# Patient Record
Sex: FEMALE | Race: WHITE | NOT HISPANIC OR LATINO | Employment: OTHER | URBAN - METROPOLITAN AREA
[De-identification: names, ages, dates, MRNs, and addresses within clinical notes are randomized per-mention and may not be internally consistent; named-entity substitution may affect disease eponyms.]

---

## 2022-08-22 ENCOUNTER — HOSPITAL ENCOUNTER (EMERGENCY)
Facility: HOSPITAL | Age: 77
Discharge: HOME | End: 2022-08-22
Attending: EMERGENCY MEDICINE | Admitting: EMERGENCY MEDICINE

## 2022-08-22 ENCOUNTER — APPOINTMENT (OUTPATIENT)
Dept: GENERAL RADIOLOGY | Facility: HOSPITAL | Age: 77
End: 2022-08-22

## 2022-08-22 VITALS
WEIGHT: 157 LBS | HEART RATE: 69 BPM | OXYGEN SATURATION: 93 % | BODY MASS INDEX: 24.64 KG/M2 | TEMPERATURE: 97.8 F | RESPIRATION RATE: 16 BRPM | DIASTOLIC BLOOD PRESSURE: 107 MMHG | SYSTOLIC BLOOD PRESSURE: 124 MMHG | HEIGHT: 67 IN

## 2022-08-22 DIAGNOSIS — M54.32 SCIATICA OF LEFT SIDE: Primary | ICD-10-CM

## 2022-08-22 PROCEDURE — 25010000002 KETOROLAC TROMETHAMINE PER 15 MG: Performed by: NURSE PRACTITIONER

## 2022-08-22 PROCEDURE — 72100 X-RAY EXAM L-S SPINE 2/3 VWS: CPT

## 2022-08-22 PROCEDURE — 96372 THER/PROPH/DIAG INJ SC/IM: CPT

## 2022-08-22 PROCEDURE — 99282 EMERGENCY DEPT VISIT SF MDM: CPT

## 2022-08-22 RX ORDER — KETOROLAC TROMETHAMINE 30 MG/ML
30 INJECTION, SOLUTION INTRAMUSCULAR; INTRAVENOUS ONCE
Status: COMPLETED | OUTPATIENT
Start: 2022-08-22 | End: 2022-08-22

## 2022-08-22 RX ORDER — IBUPROFEN 600 MG/1
600 TABLET ORAL EVERY 6 HOURS PRN
Qty: 24 TABLET | Refills: 0 | Status: SHIPPED | OUTPATIENT
Start: 2022-08-22

## 2022-08-22 RX ORDER — CYCLOBENZAPRINE HCL 10 MG
10 TABLET ORAL 3 TIMES DAILY PRN
Qty: 15 TABLET | Refills: 0 | Status: SHIPPED | OUTPATIENT
Start: 2022-08-22

## 2022-08-22 RX ADMIN — KETOROLAC TROMETHAMINE 30 MG: 30 INJECTION, SOLUTION INTRAMUSCULAR; INTRAVENOUS at 15:08

## 2022-08-22 NOTE — ED PROVIDER NOTES
Subjective   Latonya Garay is a 77-year-old female that presents emergency department for complaints of pain in her left posterior buttock.  Pain started 2-1/2 weeks ago.  Patient reports that the pain radiates down her left lower leg.  Patient denies any injury.  Patient denies any loss of bowel or bladder function.  Patient is able to ambulate.  Patient explains that she has a history of chronic left knee pain.  Patient explains that she does get her cortisone shot in her left knee regularly.      History provided by:  Patient   used: No    Leg Pain  Location:  Leg  Injury: no    Leg location:  L leg  Pain details:     Quality:  Aching    Severity:  Moderate    Timing:  Intermittent  Associated symptoms: no back pain, no decreased ROM, no muscle weakness, no numbness and no tingling        Review of Systems   Respiratory: Negative for shortness of breath.    Cardiovascular: Negative for chest pain.   Musculoskeletal: Positive for arthralgias and myalgias. Negative for back pain.   Neurological: Negative for weakness and numbness.   All other systems reviewed and are negative.      No past medical history on file.    No Known Allergies    No past surgical history on file.    No family history on file.    Social History     Socioeconomic History   • Marital status:            Objective   Physical Exam  Vitals and nursing note reviewed.   Constitutional:       Appearance: Normal appearance. She is well-developed. She is not toxic-appearing.   HENT:      Head: Normocephalic and atraumatic.   Eyes:      General: Lids are normal.      Conjunctiva/sclera: Conjunctivae normal.   Neck:      Trachea: Trachea normal.   Cardiovascular:      Rate and Rhythm: Regular rhythm.      Pulses: Normal pulses.      Heart sounds: Normal heart sounds.   Pulmonary:      Effort: Pulmonary effort is normal. No respiratory distress.      Breath sounds: Normal breath sounds. No decreased breath sounds,  wheezing, rhonchi or rales.   Abdominal:      General: Bowel sounds are normal.      Palpations: Abdomen is soft.      Tenderness: There is no abdominal tenderness.   Musculoskeletal:      Cervical back: Full passive range of motion without pain and normal range of motion.      Lumbar back: Tenderness (LT SI joint tenderness) present. Normal range of motion. Positive left straight leg raise test.      Left hip: Tenderness (posterior hip pain) present. No deformity. Normal range of motion.   Skin:     General: Skin is warm and dry.      Findings: No rash.   Neurological:      Mental Status: She is alert and oriented to person, place, and time.      Cranial Nerves: No cranial nerve deficit.   Psychiatric:         Speech: Speech normal.         Behavior: Behavior normal. Behavior is cooperative.         Procedures           ED Course  ED Course as of 08/22/22 2054   Mon Aug 22, 2022   1525 Results are discussed with patient at this time.  Patient will be discharged home.  Patient is take meds as ordered.  Patient to follow-up with PCP.  Patient aware if pain continues she will need an MRI of her lumbar spine.  Patient agrees with our treatment plan and verbalized understanding. [KG]      ED Course User Index  [KG] Xiomara Joyner, YAYA           No results found for this or any previous visit (from the past 24 hour(s)).  Note: In addition to lab results from this visit, the labs listed above may include labs taken at another facility or during a different encounter within the last 24 hours. Please correlate lab times with ED admission and discharge times for further clarification of the services performed during this visit.    XR Spine Lumbar 2 or 3 View   Final Result   Vertebral body heights are maintained without evidence of acute fracture   and alignment is anatomic without evidence of listhesis or subluxation.   Spondylosis changes are evident with some areas of disc space height   loss, facet arthropathy and  "osteophyte formation, most notable at L3-4   and L4-5. The SI joints demonstrate mild symmetric degenerative change.       This report was finalized on 8/22/2022 3:05 PM by Nikita Kee.            Vitals:    08/22/22 1322   BP: (!) 124/107   BP Location: Left arm   Patient Position: Sitting   Pulse: 69   Resp: 16   Temp: 97.8 °F (36.6 °C)   TempSrc: Oral   SpO2: 93%   Weight: 71.2 kg (157 lb)   Height: 170.2 cm (67\")     Medications   ketorolac (TORADOL) injection 30 mg (30 mg Intramuscular Given 8/22/22 1508)     ECG/EMG Results (last 24 hours)     ** No results found for the last 24 hours. **        No orders to display                                       MDM    Final diagnoses:   Sciatica of left side       ED Disposition  ED Disposition     ED Disposition   Discharge    Condition   Stable    Comment   --             Provider, No Known  [facility] [address]               Medication List      New Prescriptions    cyclobenzaprine 10 MG tablet  Commonly known as: FLEXERIL  Take 1 tablet by mouth 3 (Three) Times a Day As Needed for Muscle Spasms.     ibuprofen 600 MG tablet  Commonly known as: ADVIL,MOTRIN  Take 1 tablet by mouth Every 6 (Six) Hours As Needed for Moderate Pain .           Where to Get Your Medications      These medications were sent to [facility] [address] [place] [address] - [address] [place] [address] - [phone]  - [phone] [address] [address]    Phone: [phone]   · cyclobenzaprine 10 MG tablet  · ibuprofen 600 MG tablet          Xiomara Joyner, APRDIMPLE  08/22/22 2054    "

## 2022-08-22 NOTE — DISCHARGE INSTRUCTIONS
Take meds as ordered.    Follow-up with primary care physician as needed.    Follow-up with Ortho as needed.    If pain continues she may need an MRI of your lumbar spine.

## 2024-05-23 ENCOUNTER — HOSPITAL ENCOUNTER (EMERGENCY)
Facility: HOSPITAL | Age: 79
Discharge: HOME | End: 2024-05-23
Attending: STUDENT IN AN ORGANIZED HEALTH CARE EDUCATION/TRAINING PROGRAM
Payer: MEDICARE

## 2024-05-23 VITALS
OXYGEN SATURATION: 97 % | WEIGHT: 150.2 LBS | TEMPERATURE: 98 F | SYSTOLIC BLOOD PRESSURE: 140 MMHG | HEIGHT: 67 IN | RESPIRATION RATE: 18 BRPM | DIASTOLIC BLOOD PRESSURE: 88 MMHG | HEART RATE: 88 BPM | BODY MASS INDEX: 23.57 KG/M2

## 2024-05-23 DIAGNOSIS — R04.0 EPISTAXIS: Primary | ICD-10-CM

## 2024-05-23 LAB
ALBUMIN SERPL-MCNC: 4.4 G/DL (ref 3.5–5.2)
ALBUMIN/GLOB SERPL: 1.7 G/DL
ALP SERPL-CCNC: 70 U/L (ref 39–117)
ALT SERPL W P-5'-P-CCNC: 15 U/L (ref 1–33)
ANION GAP SERPL CALCULATED.3IONS-SCNC: 10.5 MMOL/L (ref 5–15)
AST SERPL-CCNC: 21 U/L (ref 1–32)
BASOPHILS # BLD AUTO: 0.07 10*3/MM3 (ref 0–0.2)
BASOPHILS NFR BLD AUTO: 1 % (ref 0–1.5)
BILIRUB SERPL-MCNC: 0.6 MG/DL (ref 0–1.2)
BUN SERPL-MCNC: 16 MG/DL (ref 8–23)
BUN/CREAT SERPL: 23.9 (ref 7–25)
CALCIUM SPEC-SCNC: 9.7 MG/DL (ref 8.6–10.5)
CHLORIDE SERPL-SCNC: 104 MMOL/L (ref 98–107)
CO2 SERPL-SCNC: 26.5 MMOL/L (ref 22–29)
CREAT SERPL-MCNC: 0.67 MG/DL (ref 0.57–1)
DEPRECATED RDW RBC AUTO: 42 FL (ref 37–54)
EGFRCR SERPLBLD CKD-EPI 2021: 90.2 ML/MIN/1.73
EOSINOPHIL # BLD AUTO: 0.07 10*3/MM3 (ref 0–0.4)
EOSINOPHIL NFR BLD AUTO: 1 % (ref 0.3–6.2)
ERYTHROCYTE [DISTWIDTH] IN BLOOD BY AUTOMATED COUNT: 13.3 % (ref 12.3–15.4)
GLOBULIN UR ELPH-MCNC: 2.6 GM/DL
GLUCOSE SERPL-MCNC: 122 MG/DL (ref 65–99)
HCT VFR BLD AUTO: 40.6 % (ref 34–46.6)
HGB BLD-MCNC: 14 G/DL (ref 12–15.9)
IMM GRANULOCYTES # BLD AUTO: 0.02 10*3/MM3 (ref 0–0.05)
IMM GRANULOCYTES NFR BLD AUTO: 0.3 % (ref 0–0.5)
INR PPP: 1.19 (ref 0.9–1.1)
LYMPHOCYTES # BLD AUTO: 1.63 10*3/MM3 (ref 0.7–3.1)
LYMPHOCYTES NFR BLD AUTO: 23.3 % (ref 19.6–45.3)
MCH RBC QN AUTO: 29.4 PG (ref 26.6–33)
MCHC RBC AUTO-ENTMCNC: 34.5 G/DL (ref 31.5–35.7)
MCV RBC AUTO: 85.3 FL (ref 79–97)
MONOCYTES # BLD AUTO: 0.62 10*3/MM3 (ref 0.1–0.9)
MONOCYTES NFR BLD AUTO: 8.9 % (ref 5–12)
NEUTROPHILS NFR BLD AUTO: 4.58 10*3/MM3 (ref 1.7–7)
NEUTROPHILS NFR BLD AUTO: 65.5 % (ref 42.7–76)
NRBC BLD AUTO-RTO: 0 /100 WBC (ref 0–0.2)
PLATELET # BLD AUTO: 197 10*3/MM3 (ref 140–450)
PMV BLD AUTO: 10.3 FL (ref 6–12)
POTASSIUM SERPL-SCNC: 3.6 MMOL/L (ref 3.5–5.2)
PROT SERPL-MCNC: 7 G/DL (ref 6–8.5)
PROTHROMBIN TIME: 15.7 SECONDS (ref 12.3–15.1)
RBC # BLD AUTO: 4.76 10*6/MM3 (ref 3.77–5.28)
SODIUM SERPL-SCNC: 141 MMOL/L (ref 136–145)
WBC NRBC COR # BLD AUTO: 6.99 10*3/MM3 (ref 3.4–10.8)

## 2024-05-23 PROCEDURE — 85025 COMPLETE CBC W/AUTO DIFF WBC: CPT | Performed by: STUDENT IN AN ORGANIZED HEALTH CARE EDUCATION/TRAINING PROGRAM

## 2024-05-23 PROCEDURE — 85610 PROTHROMBIN TIME: CPT | Performed by: STUDENT IN AN ORGANIZED HEALTH CARE EDUCATION/TRAINING PROGRAM

## 2024-05-23 PROCEDURE — 99283 EMERGENCY DEPT VISIT LOW MDM: CPT

## 2024-05-23 PROCEDURE — 80053 COMPREHEN METABOLIC PANEL: CPT | Performed by: STUDENT IN AN ORGANIZED HEALTH CARE EDUCATION/TRAINING PROGRAM

## 2024-05-23 RX ADMIN — SILVER NITRATE APPLICATORS 1 APPLICATION: 25; 75 STICK TOPICAL at 13:38

## 2024-05-23 NOTE — ED PROVIDER NOTES
Subjective:  History of Present Illness:    Patient's 79-year-old female with history of A-fib on Eliquis, hypertension, hypothyroidism who presents today with nosebleed.  Reports that she has had recurrent nosebleeds over the last 2 weeks.  States that this morning, she had a nosebleed which was difficult control with direct pressure.  Presented to our emergency department, had no clamp placed and she is now hemostatic.  She denies any trauma to the area.  No fevers prior to arrival.  Denies any chest pain or shortness of breath.  Does endorse malaise.  Hemostatic and well-appearing on exam      Nurses Notes reviewed and agree, including vitals, allergies, social history and prior medical history.     REVIEW OF SYSTEMS: All systems reviewed and not pertinent unless noted.  Review of Systems   Constitutional:  Negative for activity change, appetite change, chills, fatigue and fever.   HENT:  Positive for nosebleeds. Negative for rhinorrhea, sinus pressure and sinus pain.    Eyes:  Negative for discharge and itching.   Respiratory:  Negative for cough and shortness of breath.    Cardiovascular:  Negative for chest pain and leg swelling.   Gastrointestinal:  Negative for abdominal distention, abdominal pain, nausea and vomiting.   Endocrine: Negative for cold intolerance and heat intolerance.   Genitourinary:  Negative for decreased urine volume, difficulty urinating, flank pain, frequency, urgency, vaginal bleeding, vaginal discharge and vaginal pain.   Musculoskeletal:  Negative for gait problem, neck pain and neck stiffness.   Skin:  Negative for color change.   Allergic/Immunologic: Negative for environmental allergies.   Neurological:  Negative for seizures, syncope, facial asymmetry and speech difficulty.   Psychiatric/Behavioral:  Negative for self-injury and suicidal ideas.        History reviewed. No pertinent past medical history.    Allergies:    Patient has no known allergies.      History reviewed. No  "pertinent surgical history.      Social History     Socioeconomic History    Marital status:          History reviewed. No pertinent family history.    Objective  Physical Exam:  /88 (BP Location: Right arm, Patient Position: Sitting)   Pulse 88   Temp 98 °F (36.7 °C)   Resp 18   Ht 170.2 cm (67\")   Wt 68.1 kg (150 lb 3.2 oz)   SpO2 97%   BMI 23.52 kg/m²      Physical Exam  Constitutional:       General: She is not in acute distress.     Appearance: Normal appearance. She is not ill-appearing.   HENT:      Head: Normocephalic and atraumatic.      Nose: No congestion or rhinorrhea.      Comments: Patient with nose clamp in place, hemostatic with dried blood about the nares     Mouth/Throat:      Mouth: Mucous membranes are dry.      Pharynx: Oropharynx is clear. No oropharyngeal exudate or posterior oropharyngeal erythema.   Eyes:      Extraocular Movements: Extraocular movements intact.      Conjunctiva/sclera: Conjunctivae normal.      Pupils: Pupils are equal, round, and reactive to light.   Cardiovascular:      Rate and Rhythm: Normal rate and regular rhythm.      Pulses: Normal pulses.      Heart sounds: Normal heart sounds.   Pulmonary:      Effort: Pulmonary effort is normal. No respiratory distress.      Breath sounds: Normal breath sounds.   Abdominal:      General: Abdomen is flat. Bowel sounds are normal. There is no distension.      Palpations: Abdomen is soft.      Tenderness: There is no abdominal tenderness.   Musculoskeletal:         General: No swelling or tenderness. Normal range of motion.      Cervical back: Normal range of motion and neck supple.   Skin:     General: Skin is warm and dry.      Capillary Refill: Capillary refill takes less than 2 seconds.   Neurological:      General: No focal deficit present.      Mental Status: She is alert and oriented to person, place, and time. Mental status is at baseline.      Cranial Nerves: No cranial nerve deficit.      Sensory: No " sensory deficit.      Motor: No weakness.      Coordination: Coordination normal.   Psychiatric:         Mood and Affect: Mood normal.         Behavior: Behavior normal.         Thought Content: Thought content normal.         Judgment: Judgment normal.         Epistaxis Management    Date/Time: 5/23/2024 2:21 PM    Performed by: Travon Jesus MD  Authorized by: Travon Jesus MD    Consent:     Consent obtained:  Verbal  Universal protocol:     Patient identity confirmed:  Verbally with patient and arm band  Procedure details:     Treatment site:  L septum    Treatment method:  Silver nitrate    Treatment episode: initial    Post-procedure details:     Assessment:  Bleeding stopped    Procedure completion:  Tolerated well, no immediate complications      ED Course:         Lab Results (last 24 hours)       Procedure Component Value Units Date/Time    CBC & Differential [[id number]]  (Normal) Collected: 05/23/24 1213    Specimen: Blood Updated: 05/23/24 1221    Narrative:      The following orders were created for panel order CBC & Differential.  Procedure                               Abnormality         Status                     ---------                               -----------         ------                     CBC Auto Differential[[id number]]        Normal              Final result                 Please view results for these tests on the individual orders.    Comprehensive Metabolic Panel [[id number]]  (Abnormal) Collected: 05/23/24 1213    Specimen: Blood Updated: 05/23/24 1237     Glucose 122 mg/dL      BUN 16 mg/dL      Creatinine 0.67 mg/dL      Sodium 141 mmol/L      Potassium 3.6 mmol/L      Chloride 104 mmol/L      CO2 26.5 mmol/L      Calcium 9.7 mg/dL      Total Protein 7.0 g/dL      Albumin 4.4 g/dL      ALT (SGPT) 15 U/L      AST (SGOT) 21 U/L      Alkaline Phosphatase 70 U/L      Total Bilirubin 0.6 mg/dL      Globulin 2.6 gm/dL      A/G Ratio 1.7 g/dL      BUN/Creatinine Ratio 23.9      Anion Gap 10.5 mmol/L      eGFR 90.2 mL/min/1.73     Narrative:      GFR Normal >60  Chronic Kidney Disease <60  Kidney Failure <15    The GFR formula is only valid for adults with stable renal function between ages 18 and 70.    CBC Auto Differential [[id number]]  (Normal) Collected: 05/23/24 1213    Specimen: Blood Updated: 05/23/24 1221     WBC 6.99 10*3/mm3      RBC 4.76 10*6/mm3      Hemoglobin 14.0 g/dL      Hematocrit 40.6 %      MCV 85.3 fL      MCH 29.4 pg      MCHC 34.5 g/dL      RDW 13.3 %      RDW-SD 42.0 fl      MPV 10.3 fL      Platelets 197 10*3/mm3      Neutrophil % 65.5 %      Lymphocyte % 23.3 %      Monocyte % 8.9 %      Eosinophil % 1.0 %      Basophil % 1.0 %      Immature Grans % 0.3 %      Neutrophils, Absolute 4.58 10*3/mm3      Lymphocytes, Absolute 1.63 10*3/mm3      Monocytes, Absolute 0.62 10*3/mm3      Eosinophils, Absolute 0.07 10*3/mm3      Basophils, Absolute 0.07 10*3/mm3      Immature Grans, Absolute 0.02 10*3/mm3      nRBC 0.0 /100 WBC     Protime-INR [[id number]]  (Abnormal) Collected: 05/23/24 1213    Specimen: Blood Updated: 05/23/24 1248     Protime 15.7 Seconds      INR 1.19    Narrative:      Suggested INR therapeutic range for stable oral anticoagulant therapy:    Low Intensity therapy:   1.5-2.0  Moderate Intensity therapy:   2.0-3.0  High Intensity therapy:   2.5-4.0             No radiology results from the last 24 hrs       MDM      Initial impression of presenting illness: Nosebleed    DDX: includes but is not limited to: Septal ulcer, nosebleed, clotting disorder, anemia    Patient arrives stable with vitals interpreted by myself.     Pertinent features from physical exam: Clear to oscitation, regular rhythm, no murmur, nontender to abdominal palpation.    Initial diagnostic plan: CBC, CMP, PT/INR    Results from initial plan were reviewed and interpreted by me revealing no concern for significant anemia, electrolyte derangement    Diagnostic information from other  sources: Discussed with  at bedside reviewed past medical records    Interventions / Re-evaluation: Nose clamp placed and left in place for 20 minutes silver cautery as above    Results/clinical rationale were discussed with patient  at bedside    Consultations/Discussion of results with other physicians: Discussed diagnosis of epistaxis.  Cautery as documented.  Encourage patient follow-up with her primary care doctor to ensure resolution of the symptoms.  Given strict turn precaution for any uncontrollable bleeding from the nose.  Did discuss with patient if she has more recurrent episodes of these nosebleed she may need referral to ENT for further evaluation and management she voiced understanding of this.    Disposition plan: Discharge  -----        Final diagnoses:   Epistaxis          Travon Jesus MD  05/23/24 7411

## 2024-05-23 NOTE — Clinical Note
[facility] EMERGENCY DEPARTMENT  [address] [facility] [address]  Phone: [phone]    Latonya Garay was seen and treated in our emergency department on 5/23/2024.  She may return to work on 05/26/2024.         Thank you for choosing [facility].    Travon Jeuss MD

## 2024-05-23 NOTE — ED NOTES
Patient discharged at this time. Ambulated from ED without assistance, education in hands, family at side.

## 2024-05-23 NOTE — DISCHARGE INSTRUCTIONS
You were evaluated after nosebleed.  We held direct pressure to the area and your bleeding stopped.  We saw an area of concern for bleed which we cauterized with silver nitrate and you are now stable for discharge.  We recommend following up with your primary care doctor to ensure that your nosebleeds improve appropriately.  If you continue to have recurrent episodes of nosebleed you may need referral to ENT however, given your bleeding was controlled at this time, we will defer this at this time.  If you have severe hemorrhage unable be controlled at home please [facility] emergency department for further evaluation.  You are now stable for discharge.